# Patient Record
Sex: MALE | Race: WHITE | NOT HISPANIC OR LATINO | Employment: OTHER | URBAN - METROPOLITAN AREA
[De-identification: names, ages, dates, MRNs, and addresses within clinical notes are randomized per-mention and may not be internally consistent; named-entity substitution may affect disease eponyms.]

---

## 2024-05-08 ENCOUNTER — LAB (OUTPATIENT)
Dept: INTERNAL MEDICINE | Facility: CLINIC | Age: 44
End: 2024-05-08
Payer: MEDICARE

## 2024-05-08 ENCOUNTER — OFFICE VISIT (OUTPATIENT)
Dept: INTERNAL MEDICINE | Facility: CLINIC | Age: 44
End: 2024-05-08
Payer: MEDICARE

## 2024-05-08 VITALS
TEMPERATURE: 97.8 F | HEART RATE: 99 BPM | HEIGHT: 71 IN | OXYGEN SATURATION: 97 % | SYSTOLIC BLOOD PRESSURE: 114 MMHG | WEIGHT: 199.4 LBS | DIASTOLIC BLOOD PRESSURE: 60 MMHG | BODY MASS INDEX: 27.92 KG/M2 | RESPIRATION RATE: 18 BRPM

## 2024-05-08 DIAGNOSIS — M54.6 THORACIC SPINE PAIN: ICD-10-CM

## 2024-05-08 DIAGNOSIS — M51.36 DEGENERATIVE DISC DISEASE, LUMBAR: ICD-10-CM

## 2024-05-08 DIAGNOSIS — I10 PRIMARY HYPERTENSION: ICD-10-CM

## 2024-05-08 DIAGNOSIS — R79.89 LOW TESTOSTERONE: ICD-10-CM

## 2024-05-08 DIAGNOSIS — F31.9 BIPOLAR 1 DISORDER: Primary | ICD-10-CM

## 2024-05-08 LAB
ALBUMIN SERPL-MCNC: 4.4 G/DL (ref 3.5–5.2)
ALBUMIN/GLOB SERPL: 2 G/DL
ALP SERPL-CCNC: 63 U/L (ref 39–117)
ALT SERPL W P-5'-P-CCNC: 19 U/L (ref 1–41)
ANION GAP SERPL CALCULATED.3IONS-SCNC: 11.9 MMOL/L (ref 5–15)
AST SERPL-CCNC: 22 U/L (ref 1–40)
BILIRUB SERPL-MCNC: 0.3 MG/DL (ref 0–1.2)
BUN SERPL-MCNC: 23 MG/DL (ref 6–20)
BUN/CREAT SERPL: 24.2 (ref 7–25)
CALCIUM SPEC-SCNC: 9.3 MG/DL (ref 8.6–10.5)
CHLORIDE SERPL-SCNC: 101 MMOL/L (ref 98–107)
CHOLEST SERPL-MCNC: 173 MG/DL (ref 0–200)
CO2 SERPL-SCNC: 27.1 MMOL/L (ref 22–29)
CREAT SERPL-MCNC: 0.95 MG/DL (ref 0.76–1.27)
DEPRECATED RDW RBC AUTO: 40.7 FL (ref 37–54)
EGFRCR SERPLBLD CKD-EPI 2021: 102.5 ML/MIN/1.73
ERYTHROCYTE [DISTWIDTH] IN BLOOD BY AUTOMATED COUNT: 12.6 % (ref 12.3–15.4)
GLOBULIN UR ELPH-MCNC: 2.2 GM/DL
GLUCOSE SERPL-MCNC: 77 MG/DL (ref 65–99)
HCT VFR BLD AUTO: 38.3 % (ref 37.5–51)
HDLC SERPL-MCNC: 66 MG/DL (ref 40–60)
HGB BLD-MCNC: 13 G/DL (ref 13–17.7)
LDLC SERPL CALC-MCNC: 89 MG/DL (ref 0–100)
LDLC/HDLC SERPL: 1.31 {RATIO}
MCH RBC QN AUTO: 30.5 PG (ref 26.6–33)
MCHC RBC AUTO-ENTMCNC: 33.9 G/DL (ref 31.5–35.7)
MCV RBC AUTO: 89.9 FL (ref 79–97)
PLATELET # BLD AUTO: 281 10*3/MM3 (ref 140–450)
PMV BLD AUTO: 10.2 FL (ref 6–12)
POTASSIUM SERPL-SCNC: 4.3 MMOL/L (ref 3.5–5.2)
PROT SERPL-MCNC: 6.6 G/DL (ref 6–8.5)
RBC # BLD AUTO: 4.26 10*6/MM3 (ref 4.14–5.8)
SODIUM SERPL-SCNC: 140 MMOL/L (ref 136–145)
TRIGL SERPL-MCNC: 102 MG/DL (ref 0–150)
TSH SERPL DL<=0.05 MIU/L-ACNC: 0.7 UIU/ML (ref 0.27–4.2)
VLDLC SERPL-MCNC: 18 MG/DL (ref 5–40)
WBC NRBC COR # BLD AUTO: 7.97 10*3/MM3 (ref 3.4–10.8)

## 2024-05-08 PROCEDURE — 99204 OFFICE O/P NEW MOD 45 MIN: CPT

## 2024-05-08 PROCEDURE — 84403 ASSAY OF TOTAL TESTOSTERONE: CPT

## 2024-05-08 PROCEDURE — 1160F RVW MEDS BY RX/DR IN RCRD: CPT

## 2024-05-08 PROCEDURE — 80053 COMPREHEN METABOLIC PANEL: CPT

## 2024-05-08 PROCEDURE — 80061 LIPID PANEL: CPT

## 2024-05-08 PROCEDURE — 85027 COMPLETE CBC AUTOMATED: CPT

## 2024-05-08 PROCEDURE — 36415 COLL VENOUS BLD VENIPUNCTURE: CPT

## 2024-05-08 PROCEDURE — 84443 ASSAY THYROID STIM HORMONE: CPT

## 2024-05-08 PROCEDURE — 1159F MED LIST DOCD IN RCRD: CPT

## 2024-05-08 PROCEDURE — 1126F AMNT PAIN NOTED NONE PRSNT: CPT

## 2024-05-08 RX ORDER — HYDROXYZINE PAMOATE 50 MG/1
1 CAPSULE ORAL 3 TIMES DAILY
COMMUNITY
Start: 2024-03-28

## 2024-05-08 RX ORDER — BUPROPION HYDROCHLORIDE 300 MG/1
TABLET ORAL
COMMUNITY
Start: 2024-04-15 | End: 2024-05-08 | Stop reason: SDUPTHER

## 2024-05-08 RX ORDER — MONTELUKAST SODIUM 10 MG/1
TABLET ORAL
COMMUNITY
Start: 2024-04-25

## 2024-05-08 RX ORDER — LISINOPRIL 10 MG/1
1 TABLET ORAL DAILY
COMMUNITY
Start: 2023-12-21

## 2024-05-08 RX ORDER — METHOCARBAMOL 500 MG/1
500 TABLET, FILM COATED ORAL 4 TIMES DAILY
Qty: 90 TABLET | Refills: 3 | Status: SHIPPED | OUTPATIENT
Start: 2024-05-08

## 2024-05-08 RX ORDER — CETIRIZINE HYDROCHLORIDE 10 MG/1
10 TABLET ORAL DAILY
COMMUNITY

## 2024-05-08 RX ORDER — QUETIAPINE FUMARATE 300 MG/1
TABLET, FILM COATED ORAL
COMMUNITY
Start: 2024-04-11

## 2024-05-08 RX ORDER — QUETIAPINE FUMARATE 50 MG/1
TABLET, FILM COATED ORAL
COMMUNITY
Start: 2024-04-15

## 2024-05-08 RX ORDER — BUPROPION HYDROCHLORIDE 300 MG/1
300 TABLET ORAL EVERY MORNING
Qty: 90 TABLET | Refills: 1 | Status: SHIPPED | OUTPATIENT
Start: 2024-05-08

## 2024-05-08 RX ORDER — BUPRENORPHINE AND NALOXONE 8; 2 MG/1; MG/1
FILM, SOLUBLE BUCCAL; SUBLINGUAL
COMMUNITY
Start: 2024-05-06

## 2024-05-08 RX ORDER — CLONIDINE HYDROCHLORIDE 0.1 MG/1
TABLET ORAL
COMMUNITY
Start: 2024-05-03

## 2024-05-08 RX ORDER — TESTOSTERONE CYPIONATE 200 MG/ML
INJECTION, SOLUTION INTRAMUSCULAR
COMMUNITY
Start: 2024-01-18

## 2024-05-08 RX ORDER — SILDENAFIL 100 MG/1
1 TABLET, FILM COATED ORAL DAILY
COMMUNITY
Start: 2024-03-15

## 2024-05-08 RX ORDER — VENLAFAXINE HYDROCHLORIDE 150 MG/1
150 CAPSULE, EXTENDED RELEASE ORAL
COMMUNITY

## 2024-05-08 RX ORDER — LEVOCETIRIZINE DIHYDROCHLORIDE 5 MG/1
TABLET, FILM COATED ORAL
COMMUNITY
Start: 2024-04-25

## 2024-05-08 RX ORDER — QUETIAPINE FUMARATE 100 MG/1
100 TABLET, FILM COATED ORAL
COMMUNITY

## 2024-05-09 ENCOUNTER — HOSPITAL ENCOUNTER (OUTPATIENT)
Dept: GENERAL RADIOLOGY | Facility: HOSPITAL | Age: 44
Discharge: HOME | End: 2024-05-09
Payer: MEDICARE

## 2024-05-09 DIAGNOSIS — M51.36 DEGENERATIVE DISC DISEASE, LUMBAR: ICD-10-CM

## 2024-05-09 LAB — TESTOST SERPL-MCNC: 39.9 NG/DL (ref 249–836)

## 2024-05-09 PROCEDURE — 72070 X-RAY EXAM THORAC SPINE 2VWS: CPT

## 2024-05-09 PROCEDURE — 72100 X-RAY EXAM L-S SPINE 2/3 VWS: CPT

## 2024-05-10 ENCOUNTER — PATIENT ROUNDING (BHMG ONLY) (OUTPATIENT)
Dept: INTERNAL MEDICINE | Facility: CLINIC | Age: 44
End: 2024-05-10
Payer: MEDICARE

## 2024-05-10 ENCOUNTER — TELEPHONE (OUTPATIENT)
Dept: INTERNAL MEDICINE | Facility: CLINIC | Age: 44
End: 2024-05-10

## 2024-05-10 ENCOUNTER — TELEPHONE (OUTPATIENT)
Dept: INTERNAL MEDICINE | Facility: CLINIC | Age: 44
End: 2024-05-10
Payer: MEDICARE

## 2024-05-10 NOTE — TELEPHONE ENCOUNTER
Caller: Daniel Law    Relationship: Self    Best call back number: [phone]     What is the medical concern/diagnosis: LOW TESTOSTERONE    What specialty or service is being requested: UROLOGY    What is the office location: [facility]    Any additional details: PATIENT ADVISES THAT PREVIOUS REFERRAL SENT FOR UROLOGY IS OUT OF NETWORK WITH HIS INSURANCE, PLEASE SEND ANOTHER REFERRAL. THANK YOU.

## 2024-05-10 NOTE — TELEPHONE ENCOUNTER
Caller: Daniel Law    Relationship: Self    Best call back number: [phone]     What orders are you requesting (i.e. lab or imaging): MRI OF MID AND LOWER BACK    In what timeframe would the patient need to come in: ASAP    Where will you receive your lab/imaging services: ANYWHERE    Additional notes: PATIENT ADVISES IN ADDITION TO X-RAYS HE WOULD LIKE TO HAVE AN MRI DONE.

## 2024-05-13 ENCOUNTER — TELEPHONE (OUTPATIENT)
Dept: INTERNAL MEDICINE | Facility: CLINIC | Age: 44
End: 2024-05-13
Payer: MEDICARE

## 2024-05-13 NOTE — TELEPHONE ENCOUNTER
Left patient a message letting him know I am sending him a my chart message letting him know the results of his xrays.

## 2024-05-14 ENCOUNTER — TELEPHONE (OUTPATIENT)
Dept: INTERNAL MEDICINE | Facility: CLINIC | Age: 44
End: 2024-05-14

## 2024-05-14 NOTE — TELEPHONE ENCOUNTER
Caller: Daniel Law    Relationship: Self    Best call back number: [phone]     What is the best time to reach you: ANY    Who are you requesting to speak with (clinical staff, provider,  specific staff member): REFERRAL COORDINATOR    What was the call regarding: THE PATIENT WAS TOLD THAT HIS UROLOGY REFERRAL WAS SENT TO  UROLOGY. HE JUST SPOKE TO THEM AND THEY DO NOT HAVE A REFERRAL. HE WAS ORIGINALLY SENT TO [facility] BUT THEY ARE NOT IN NETWORK SO IT HAD TO BE TRANSFERRED. THE PATIENT SAID THAT IT SHOULD HAVE BEEN MARKED AS AN URGENT REFERRAL. PLEASE RESEND THIS ASAP AND CALL HIM TO LET HIM KNOW. HE IS ASKING IF POSSIBLE FOR THIS TO BE SENT TODAY.     Is it okay if the provider responds through [employer]: NO

## 2024-05-17 ENCOUNTER — TELEPHONE (OUTPATIENT)
Dept: INTERNAL MEDICINE | Facility: CLINIC | Age: 44
End: 2024-05-17
Payer: MEDICARE

## 2024-05-17 RX ORDER — HYDROXYZINE PAMOATE 50 MG/1
50 CAPSULE ORAL 3 TIMES DAILY
Qty: 90 CAPSULE | Refills: 0 | Status: SHIPPED | OUTPATIENT
Start: 2024-05-17

## 2024-05-17 RX ORDER — TESTOSTERONE CYPIONATE 200 MG/ML
INJECTION, SOLUTION INTRAMUSCULAR
OUTPATIENT
Start: 2024-05-17

## 2024-05-17 NOTE — TELEPHONE ENCOUNTER
HUB to relay:  Dr. Bourgeois has denied the request for testosterone: I denied tesosterone prescription - looks like [place] has referred to Urology for this so will be getting from them and not our office

## 2024-05-17 NOTE — TELEPHONE ENCOUNTER
America Bourgeois MD  [facility] [address] [facility]  I denied tesosterone prescription - looks like [place] has referred to Urology for this so will be getting from them and not our office

## 2024-05-21 DIAGNOSIS — M51.36 DEGENERATIVE DISC DISEASE, LUMBAR: Primary | ICD-10-CM

## 2024-05-21 DIAGNOSIS — M54.6 THORACIC SPINE PAIN: ICD-10-CM

## 2024-05-22 RX ORDER — LEVOCETIRIZINE DIHYDROCHLORIDE 5 MG/1
5 TABLET, FILM COATED ORAL EVERY EVENING
Qty: 90 TABLET | Refills: 1 | Status: SHIPPED | OUTPATIENT
Start: 2024-05-22

## 2024-05-22 RX ORDER — QUETIAPINE FUMARATE 100 MG/1
100 TABLET, FILM COATED ORAL NIGHTLY
Qty: 90 TABLET | Refills: 1 | Status: SHIPPED | OUTPATIENT
Start: 2024-05-22

## 2024-05-22 RX ORDER — MONTELUKAST SODIUM 10 MG/1
10 TABLET ORAL NIGHTLY
Qty: 90 TABLET | Refills: 1 | Status: SHIPPED | OUTPATIENT
Start: 2024-05-22

## 2024-05-22 RX ORDER — VENLAFAXINE HYDROCHLORIDE 150 MG/1
150 CAPSULE, EXTENDED RELEASE ORAL DAILY
Qty: 90 CAPSULE | Refills: 1 | Status: SHIPPED | OUTPATIENT
Start: 2024-05-22

## 2024-05-22 RX ORDER — QUETIAPINE FUMARATE 300 MG/1
300 TABLET, FILM COATED ORAL NIGHTLY
Qty: 90 TABLET | Refills: 1 | Status: SHIPPED | OUTPATIENT
Start: 2024-05-22

## 2024-05-22 NOTE — TELEPHONE ENCOUNTER
Patient has been notified about the MRI and PT.  Patient also needs refills on:  montelukast, levocetrizine, both doses of quetiapine and venlafaxine.  Please send to [facility].

## 2024-05-23 RX ORDER — LEVOCETIRIZINE DIHYDROCHLORIDE 5 MG/1
5 TABLET, FILM COATED ORAL EVERY EVENING
Qty: 90 TABLET | Refills: 1 | OUTPATIENT
Start: 2024-05-23

## 2024-05-23 RX ORDER — VENLAFAXINE HYDROCHLORIDE 150 MG/1
150 CAPSULE, EXTENDED RELEASE ORAL DAILY
Qty: 90 CAPSULE | Refills: 1 | OUTPATIENT
Start: 2024-05-23

## 2024-05-23 RX ORDER — MONTELUKAST SODIUM 10 MG/1
10 TABLET ORAL NIGHTLY
Qty: 90 TABLET | Refills: 1 | OUTPATIENT
Start: 2024-05-23

## 2024-05-23 RX ORDER — QUETIAPINE FUMARATE 300 MG/1
300 TABLET, FILM COATED ORAL NIGHTLY
Qty: 90 TABLET | Refills: 1 | OUTPATIENT
Start: 2024-05-23

## 2024-05-23 RX ORDER — QUETIAPINE FUMARATE 100 MG/1
100 TABLET, FILM COATED ORAL NIGHTLY
Qty: 90 TABLET | Refills: 1 | OUTPATIENT
Start: 2024-05-23

## 2024-05-23 NOTE — TELEPHONE ENCOUNTER
Lisinopril was last prescribed by historical provider       Last office visit with prescribing clinician: 5/8/2024     Next office visit with prescribing clinician: 11/11/2024

## 2024-05-28 RX ORDER — LISINOPRIL 10 MG/1
10 TABLET ORAL DAILY
Qty: 90 TABLET | Refills: 1 | Status: SHIPPED | OUTPATIENT
Start: 2024-05-28

## 2024-05-29 ENCOUNTER — TELEPHONE (OUTPATIENT)
Dept: INTERNAL MEDICINE | Facility: CLINIC | Age: 44
End: 2024-05-29
Payer: MEDICARE

## 2024-05-29 NOTE — TELEPHONE ENCOUNTER
Patient has been notified his medication was sent to the pharmacy yesterday.  He is concerned about his insurance and not being able to be seen by his primary care.  I gave him the number to billing and asked him to call them with his questions.

## 2024-06-26 ENCOUNTER — TELEPHONE (OUTPATIENT)
Dept: INTERNAL MEDICINE | Facility: CLINIC | Age: 44
End: 2024-06-26
Payer: MEDICARE

## 2024-06-26 NOTE — TELEPHONE ENCOUNTER
----- Message from [place] sent at 6/25/2024  3:11 PM EDT -----  Please let Daniel know that his MRI is just showed degenerative disc disease.  If you would like for me to I will put in a referral to physical therapy as this will help with his pain.  Also recommend continuing the muscle relaxers can prescribe anti-inflammatories if he is interested

## 2024-06-26 NOTE — TELEPHONE ENCOUNTER
Left patient voicemail to go over lab results.   Office number provided.        HUB RELAY:  Please let Daniel know that his MRI is just showed degenerative disc disease. If you would like for me to I will put in a referral to physical therapy as this will help with his pain. Also recommend continuing the muscle relaxers can prescribe anti-inflammatories if he is interested

## 2024-07-05 ENCOUNTER — HOSPITAL ENCOUNTER (EMERGENCY)
Facility: HOSPITAL | Age: 44
Discharge: HOME | End: 2024-07-06
Attending: EMERGENCY MEDICINE
Payer: MEDICARE

## 2024-07-05 VITALS
TEMPERATURE: 98.9 F | HEART RATE: 129 BPM | DIASTOLIC BLOOD PRESSURE: 83 MMHG | BODY MASS INDEX: 26.6 KG/M2 | RESPIRATION RATE: 13 BRPM | OXYGEN SATURATION: 100 % | HEIGHT: 71 IN | SYSTOLIC BLOOD PRESSURE: 148 MMHG | WEIGHT: 190 LBS

## 2024-07-05 DIAGNOSIS — F19.10 DRUG ABUSE: Primary | ICD-10-CM

## 2024-07-05 DIAGNOSIS — R00.0 TACHYCARDIA: ICD-10-CM

## 2024-07-05 PROCEDURE — 99283 EMERGENCY DEPT VISIT LOW MDM: CPT

## 2024-07-06 NOTE — DISCHARGE INSTRUCTIONS
Follow-up with your primary care provider for further evaluation.  Return to the emergency department for any worsening symptoms.  Thank you for allowing us to participate in your care today.

## 2024-07-06 NOTE — FSED PROVIDER NOTE
Subjective  History of Present Illness:    This is a 43 year old male who present with anxiety. Patient does endorse methamphetamine use today and was found running down [facility]. Police recommended he come to the ER for evaluation. Patient has no complaints other than anxiety. He denies any shortness of breath, no chest pain, no nausea, vomiting.       Nurses Notes reviewed and agree, including vitals, allergies, social history and prior medical history.     REVIEW OF SYSTEMS: All systems reviewed and not pertinent unless noted.  Review of Systems   Psychiatric/Behavioral:  Positive for agitation.        Past Medical History:   Diagnosis Date    ADHD (attention deficit hyperactivity disorder) 2015    Allergic 2007    Busipron, lithium,zyprexa,invega,thorizine,cymbalta,zolft,celxa,vivotrol,lucemyra,abilify,vraylar,    Anxiety 2008    Arthritis 2009    Disc generative disease bulging disc    COPD (chronic obstructive pulmonary disease) 2013    Scared lungs from pneumonia [facility]    Erectile dysfunction 2022    Hypertension 2010    Low back pain 2010    Pneumonia     Substance abuse 1999       Allergies:    Chlorpromazine, Lithium, Olanzapine, Paliperidone, Flunarizine, Celecoxib, Ibuprofen, Sulfamethoxazole-trimethoprim, Tramadol, and Trazodone      Past Surgical History:   Procedure Laterality Date    SPINE SURGERY  2010    Ex-ray injection         Social History     Socioeconomic History    Marital status:    Tobacco Use    Smoking status: Former     Current packs/day: 0.00     Average packs/day: 3.0 packs/day for 26.1 years (78.2 ttl pk-yrs)     Types: Cigarettes     Start date: 1/9/1995     Quit date: 2/2/2021     Years since quitting: 3.4     Passive exposure: Past    Smokeless tobacco: Never   Vaping Use    Vaping status: Never Used   Substance and Sexual Activity    Alcohol use: Yes     Alcohol/week: 12.0 standard drinks of alcohol     Types: 12 Shots of liquor per week  "    Comment: Pint 100 proof boogie    Drug use: Defer    Sexual activity: Not Currently     Partners: Female     Birth control/protection: Abstinence     Comment: Need prior authorization for implant         Family History   Problem Relation Age of Onset    Alcohol abuse Father     Anxiety disorder Father     Arthritis Father     Hyperlipidemia Father     Hypertension Father     Drug abuse Brother        Objective  Physical Exam:  /83   Pulse (!) 129   Temp 98.9 °F (37.2 °C) (Oral)   Resp 13   Ht 180.3 cm (71\")   Wt 86.2 kg (190 lb)   SpO2 100%   BMI 26.50 kg/m²      Physical Exam  Vitals and nursing note reviewed.   Constitutional:       Appearance: He is diaphoretic.   Cardiovascular:      Rate and Rhythm: Regular rhythm. Tachycardia present.      Pulses: Normal pulses.      Heart sounds: Normal heart sounds.   Pulmonary:      Effort: Pulmonary effort is normal.      Breath sounds: Normal breath sounds.   Abdominal:      General: Abdomen is flat. Bowel sounds are normal.      Palpations: Abdomen is soft.   Musculoskeletal:         General: Normal range of motion.   Skin:     General: Skin is warm and dry.   Neurological:      General: No focal deficit present.      Mental Status: He is alert and oriented to person, place, and time.         Procedures    ED Course:         Lab Results (last 24 hours)       Procedure Component Value Units Date/Time    ED HIV 1/2 Antibody/Antigen Screen w/Reflex to HIV 1/2 Differentiation [[id number]]  (Normal) Collected: 07/05/24 1535     Updated: 07/05/24 2152    Hepatitis C Antibody - ED [[id number]]  (Normal) Collected: 07/05/24 1535    Specimen: Blood, Venous Line Updated: 07/05/24 2152     HCV Qual Negative    CK [[id number]]  (Abnormal) Collected: 07/05/24 1535    Specimen: Blood, Venous Line Updated: 07/05/24 2152     Creatine Kinase 886 U/L     Blood gas panel, venous [[id number]]  (Abnormal) Collected: 07/05/24 1535    Specimen: Blood, Venous Line Updated: " 07/05/24 2152     pH, Venous 7.40     pCO2, Capillary 38 mmHg      pO2, Venous 48 mmHg      SO2 82 %      Base Excess -0.8 mmol/L      HCO3, Venous 24 mmol/L      Hematocrit, Blood Gas 41.0 %      Sodium 144 mmol/L      Potassium 3.9 mmol/L      Chloride 106 mmol/L      Glucose 108 mg/dL      Lactate, Arterial 2.0 mmol/L      Ionized Calcium, Arterial 5.0 mg/dL     PROBNP (REFERENCE) [[id number]]  (Normal) Collected: 07/05/24 1535    Specimen: Blood, Venous Line Updated: 07/05/24 2152     proBNP <50 pg/mL     TROPONIN [[id number]]  (Normal) Collected: 07/05/24 1535    Specimen: Blood, Venous Line Updated: 07/05/24 2152     Troponin T 9 ng/L     ETHANOL [[id number]]  (Normal) Collected: 07/05/24 1535    Specimen: Blood, Venous Line Updated: 07/05/24 2152     Ethanol <10 mg/dL     Narrative:      Enzymatic Assay:    Performed on Roche Ivet.    MAGNESIUM [[id number]]  (Normal) Collected: 07/05/24 1535    Specimen: Blood, Venous Line Updated: 07/05/24 2152     Magnesium 2.1 mg/dL     COMPREHENSIVE METABOLIC PANEL [[id number]]  (Abnormal) Collected: 07/05/24 1535     Updated: 07/05/24 2152    PROTIME-INR [[id number]]  (Abnormal) Collected: 07/05/24 1535    Specimen: Blood, Venous Line Updated: 07/05/24 2152     Protime 14.4 sec      INR 1.1    Narrative:      OPTIMAL INR RANGES FOR PATIENT ON ORAL ANTICOAGULANT THERAPY    Prevention of venous thromboembolism      INR 2.0 to 3.0    In patients with heart disease:  Atrial fibrillation                  INR 2.0 to 3.0  Valvular heart disease               INR 2.0 to 3.0  Tissue heart valves                  INR 2.0 to 3.0  Mechanical prosthetic valves         INR 2.5 to 3.5  Prevention of recurrent MI           INR 2.5 to 3.5    CBC AND DIFFERENTIAL [[id number]]  (Abnormal) Collected: 07/05/24 1535    Specimen: Blood, Venous Line Updated: 07/05/24 2152     WBC 8.97 10*3/uL      RBC 4.36 10*6/uL      Hemoglobin 13.3 g/dL      Hematocrit 38.7 %      Platelets 310 10*3/uL      MCV 89  fL      MCH 30.5 pg      MCHC 34.4 g/dL      RDW 13.3 %      MPV 9.2 fL      nRBC 0.0 per 100 WBCs      Differential Type Automated     Neutrophil Rel % 93.0 %      Lymphocyte Rel % 6.0 %      Monocyte Rel % 1.0 %      Eosinophil % 0.0 %      Basophil Rel % 0.0 %      Immature Grans % 0.0 %      Neutrophils Absolute 8.21 10*3/uL      Lymphocytes Absolute 0.57 10*3/uL      Monocytes Absolute 0.12 10*3/uL      Eosinophils Absolute 0.01 10*3/uL      Basophils Absolute 0.04 10*3/uL      Immature Grans, Absolute 0.02 10*3/uL     Narrative:      Therapeutic decision making should be based on absolute values, rather than percentages.    Compliance Drug Analysis, Ur - [[id number]] Collected: 07/05/24 1536    Specimen: Urine, Clean Catch Updated: 07/05/24 2152     Amphet/Methamphet, Screen Presumptive positive. Confirmation by LC-MS/MS to follow.     Benzodiazepine Screen, Urine Negative     THC Screen, Urine Presumptive positive. Confirmation by LC-MS/MS to follow.     Cocaine Screen, Urine Presumptive positive. Confirmation by LC-MS/MS to follow.     Barbiturates Screen, Urine Negative     Opiate Screen Negative     Methadone Screen, Urine Negative     Buprenorphine, Urine Presumptive positive. Confirmation by LC-MS/MS to follow.     Fentanyl, Urine Presumptive positive. Confirmation by LC-MS/MS to follow.     Oxycodone Screen, Urine Negative    Urinalysis With Microscopic If Indicated (No Culture) - [[id number]]  (Abnormal) Collected: 07/05/24 1536    Specimen: Urine, Clean Catch Updated: 07/05/24 2152     Color, UA Dark Yellow     Appearance, UA Cloudy     Specific Gravity, UA >=1.030     pH, UA 5.5     Total Protein, urine 30 mg/dL      Glucose Negative mg/dL      External Ketones, Urine 40 mg/dL      External Hemoglobin, Urine Negative     Bilirubin, UA Negative     Urobilinogen, UA 1.0 mg/dL      Leukocytes, UA Negative     Nitrite, Quantitative, Urine Negative    CK [[id number]]  (Abnormal) Collected: 07/05/24 1808     Specimen: Blood, Venous Line Updated: 07/05/24 2152     Creatine Kinase 784 U/L     TROPONIN T REFERENCE [[id number]]  (Normal) Collected: 07/05/24 1808    Specimen: Blood, Venous Line Updated: 07/05/24 2152     Troponin T 9 ng/L              XR Chest 1 View    Result Date: 7/5/2024  CLINICAL INDICATION: chest pain TECHNIQUE: XR CHEST 1 VIEW COMPARISON: Radiographic rib series 6/26/2024 FINDINGS: Stable cardiac silhouette and mediastinal contours. Lungs are clear. No pleural effusion or pneumothorax. No acute osseous abnormality.    Impression: No acute findings. CRITICAL RESULT:   No. COMMUNICATION: Per this written report. By electronically signing this report, I, the attending physician, attest that I have personally reviewed the images/data for the above examination(s) and agree with the final edited report. Drafted by Alvin Hough DO on 7/5/2024 3:39 PM Final report signed by Corrina Hale MD on 7/5/2024 3:45 PM        MDM     Amount and/or Complexity of Data Reviewed  Tests in the medicine section of CPT®: reviewed        Initial impression of presenting illness: Patient evaluated emergency department for anxiety.  Patient recently seen at the [facility] just prior to arrival at our facility.  Workup there was unremarkable with the exception of methamphetamine and CK elevation.  Patient able to eat and drink and felt much better after resting in the emergency department.  Patient is currently homeless but states that he has a room that he could go rent.  Patient denies any suicidal ideation, no chest pain, shortness of breath.  Patient will be discharged with instructions to follow-up with his primary care for further evaluation.  Discussed findings and plan of care with the patient who is agreeable to discharge.     DDX: includes but is not limited to: Drug abuse, tachycardia, rhabdomyolysis      Medications - No data to display      Data interpreted: Nursing notes reviewed, vital signs  reviewed.  Labs independently interpreted by me (CBC, CMP, lipase, UA, troponin, ABG, lactic acid, procalcitonin).  Imaging independently interpreted by me (x-ray, CT scan).  EKG independently interpreted by me.  O2 saturation:    Counseling: Discussed the results above with the patient regarding need for admission or discharge.  Patient understands and agrees plan of care.      -----  ED Disposition       None          Final diagnoses:   None     Your Follow-Up Providers    Follow-up information has not been specified.       Contact information for after-discharge care    Follow-up information has not been specified.          Your medication list        ASK your doctor about these medications        Instructions Last Dose Given Next Dose Due   buprenorphine-naloxone 8-2 MG film film  Commonly known as: SUBOXONE           buPROPion  MG 24 hr tablet  Commonly known as: WELLBUTRIN XL      Take 1 tablet by mouth Every Morning.       cloNIDine 0.1 MG tablet  Commonly known as: CATAPRES           hydrOXYzine pamoate 50 MG capsule  Commonly known as: VISTARIL      Take 1 capsule by mouth 3 times a day.       levocetirizine 5 MG tablet  Commonly known as: XYZAL      Take 1 tablet by mouth Every Evening.       lisinopril 10 MG tablet  Commonly known as: PRINIVIL,ZESTRIL      Take 1 tablet by mouth Daily.       methocarbamol 500 MG tablet  Commonly known as: ROBAXIN      Take 1 tablet by mouth 4 (Four) Times a Day.       montelukast 10 MG tablet  Commonly known as: SINGULAIR      Take 1 tablet by mouth Every Night.       QUEtiapine 300 MG tablet  Commonly known as: SEROquel      Take 1 tablet by mouth Every Night.       QUEtiapine 100 MG tablet  Commonly known as: SEROquel      Take 1 tablet by mouth Every Night.       sildenafil 100 MG tablet  Commonly known as: VIAGRA      Take 1 tablet by mouth Daily.       Testosterone Cypionate 200 MG/ML injection  Commonly known as: DEPOTESTOTERONE CYPIONATE      INJECT 1ML  INTRAMUSCULARLY EVERY OTHER WEEK AS DIRECTED       venlafaxine  MG 24 hr capsule  Commonly known as: EFFEXOR-XR      Take 1 capsule by mouth Daily.

## 2024-08-26 ENCOUNTER — E-VISIT (OUTPATIENT)
Dept: ADMINISTRATIVE | Facility: OTHER | Age: 44
End: 2024-08-26
Payer: MEDICARE

## 2024-08-26 NOTE — E-VISIT ESCALATED
Status: Referred Out  Date: 2024 14:56:46  Acuity Level: Within 1-2 weeks  Referral message:  Your health is our priority. [facility] only allows for refills of erectile dysfunction medication. Because you would like to start a new erectile dysfunction medication, we would like for you to be seen in person to discuss your   health history and determine the treatment that will be both safe and effective for you.  For the most appropriate care, please be seen:   At a clinic  Within 1-2 weeks   You won't be charged for this visit. Thank you for trusting us with your care!   Patient: Daniel Law  Patient : 1980  Patient Address: [place] [address]  Patient Phone: [phone]  Clinician Response: Unavailable  Diagnosis: Unavailable  Diagnosis ICD: Unavailable     Patient Interview Questions and Responses:  Clinical Protocol: Erectile dysfunction  Please select the reason for your visit today.: Erectile dysfunction  Please tell us the reason for your visit today.: Start a new erectile dysfunction medication

## 2025-03-03 ENCOUNTER — HOSPITAL ENCOUNTER (EMERGENCY)
Facility: HOSPITAL | Age: 45
Discharge: HOME | End: 2025-03-03
Attending: EMERGENCY MEDICINE | Admitting: EMERGENCY MEDICINE
Payer: MEDICARE

## 2025-03-03 ENCOUNTER — APPOINTMENT (OUTPATIENT)
Dept: GENERAL RADIOLOGY | Facility: HOSPITAL | Age: 45
End: 2025-03-03
Payer: MEDICARE

## 2025-03-03 VITALS
TEMPERATURE: 98.3 F | WEIGHT: 170 LBS | SYSTOLIC BLOOD PRESSURE: 144 MMHG | OXYGEN SATURATION: 99 % | DIASTOLIC BLOOD PRESSURE: 84 MMHG | HEIGHT: 71 IN | BODY MASS INDEX: 23.8 KG/M2 | HEART RATE: 92 BPM | RESPIRATION RATE: 14 BRPM

## 2025-03-03 DIAGNOSIS — F41.0 PANIC ATTACK: ICD-10-CM

## 2025-03-03 DIAGNOSIS — R07.9 CHEST PAIN, UNSPECIFIED TYPE: Primary | ICD-10-CM

## 2025-03-03 DIAGNOSIS — R06.02 SOB (SHORTNESS OF BREATH): ICD-10-CM

## 2025-03-03 LAB
ALBUMIN SERPL-MCNC: 4.8 G/DL (ref 3.5–5.2)
ALBUMIN/GLOB SERPL: 1.7 G/DL
ALP SERPL-CCNC: 78 U/L (ref 39–117)
ALT SERPL W P-5'-P-CCNC: 34 U/L (ref 1–41)
ANION GAP SERPL CALCULATED.3IONS-SCNC: 13 MMOL/L (ref 5–15)
AST SERPL-CCNC: 38 U/L (ref 1–40)
BASOPHILS # BLD AUTO: 0.05 10*3/MM3 (ref 0–0.2)
BASOPHILS NFR BLD AUTO: 0.8 % (ref 0–1.5)
BILIRUB SERPL-MCNC: 0.4 MG/DL (ref 0–1.2)
BUN SERPL-MCNC: 16 MG/DL (ref 6–20)
BUN/CREAT SERPL: 21.3 (ref 7–25)
CALCIUM SPEC-SCNC: 10.1 MG/DL (ref 8.6–10.5)
CHLORIDE SERPL-SCNC: 100 MMOL/L (ref 98–107)
CO2 SERPL-SCNC: 26 MMOL/L (ref 22–29)
CREAT SERPL-MCNC: 0.75 MG/DL (ref 0.76–1.27)
DEPRECATED RDW RBC AUTO: 45.6 FL (ref 37–54)
EGFRCR SERPLBLD CKD-EPI 2021: 114.8 ML/MIN/1.73
EOSINOPHIL # BLD AUTO: 0.15 10*3/MM3 (ref 0–0.4)
EOSINOPHIL NFR BLD AUTO: 2.4 % (ref 0.3–6.2)
ERYTHROCYTE [DISTWIDTH] IN BLOOD BY AUTOMATED COUNT: 13.3 % (ref 12.3–15.4)
GLOBULIN UR ELPH-MCNC: 2.8 GM/DL
GLUCOSE SERPL-MCNC: 109 MG/DL (ref 65–99)
HCT VFR BLD AUTO: 40.2 % (ref 37.5–51)
HGB BLD-MCNC: 13.5 G/DL (ref 13–17.7)
HOLD SPECIMEN: NORMAL
IMM GRANULOCYTES # BLD AUTO: 0.01 10*3/MM3 (ref 0–0.05)
IMM GRANULOCYTES NFR BLD AUTO: 0.2 % (ref 0–0.5)
LIPASE SERPL-CCNC: 18 U/L (ref 13–60)
LYMPHOCYTES # BLD AUTO: 1.67 10*3/MM3 (ref 0.7–3.1)
LYMPHOCYTES NFR BLD AUTO: 26.8 % (ref 19.6–45.3)
MCH RBC QN AUTO: 30.9 PG (ref 26.6–33)
MCHC RBC AUTO-ENTMCNC: 33.6 G/DL (ref 31.5–35.7)
MCV RBC AUTO: 92 FL (ref 79–97)
MONOCYTES # BLD AUTO: 0.58 10*3/MM3 (ref 0.1–0.9)
MONOCYTES NFR BLD AUTO: 9.3 % (ref 5–12)
NEUTROPHILS NFR BLD AUTO: 3.77 10*3/MM3 (ref 1.7–7)
NEUTROPHILS NFR BLD AUTO: 60.5 % (ref 42.7–76)
NRBC BLD AUTO-RTO: 0 /100 WBC (ref 0–0.2)
NT-PROBNP SERPL-MCNC: 325.8 PG/ML (ref 0–450)
PLATELET # BLD AUTO: 273 10*3/MM3 (ref 140–450)
PMV BLD AUTO: 9.6 FL (ref 6–12)
POTASSIUM SERPL-SCNC: 4 MMOL/L (ref 3.5–5.2)
PROT SERPL-MCNC: 7.6 G/DL (ref 6–8.5)
QT INTERVAL: 348 MS
QT INTERVAL: 380 MS
QTC INTERVAL: 441 MS
QTC INTERVAL: 454 MS
RBC # BLD AUTO: 4.37 10*6/MM3 (ref 4.14–5.8)
SODIUM SERPL-SCNC: 139 MMOL/L (ref 136–145)
TROPONIN T SERPL HS-MCNC: 7 NG/L
TROPONIN T SERPL HS-MCNC: 7 NG/L
WBC NRBC COR # BLD AUTO: 6.23 10*3/MM3 (ref 3.4–10.8)
WHOLE BLOOD HOLD COAG: NORMAL
WHOLE BLOOD HOLD SPECIMEN: NORMAL

## 2025-03-03 PROCEDURE — 83690 ASSAY OF LIPASE: CPT | Performed by: EMERGENCY MEDICINE

## 2025-03-03 PROCEDURE — 84484 ASSAY OF TROPONIN QUANT: CPT | Performed by: EMERGENCY MEDICINE

## 2025-03-03 PROCEDURE — 99284 EMERGENCY DEPT VISIT MOD MDM: CPT

## 2025-03-03 PROCEDURE — 85025 COMPLETE CBC W/AUTO DIFF WBC: CPT | Performed by: EMERGENCY MEDICINE

## 2025-03-03 PROCEDURE — 71045 X-RAY EXAM CHEST 1 VIEW: CPT

## 2025-03-03 PROCEDURE — 93005 ELECTROCARDIOGRAM TRACING: CPT | Performed by: EMERGENCY MEDICINE

## 2025-03-03 PROCEDURE — 83880 ASSAY OF NATRIURETIC PEPTIDE: CPT | Performed by: EMERGENCY MEDICINE

## 2025-03-03 PROCEDURE — 80053 COMPREHEN METABOLIC PANEL: CPT | Performed by: EMERGENCY MEDICINE

## 2025-03-03 PROCEDURE — 36415 COLL VENOUS BLD VENIPUNCTURE: CPT

## 2025-03-03 RX ORDER — SODIUM CHLORIDE 0.9 % (FLUSH) 0.9 %
10 SYRINGE (ML) INJECTION AS NEEDED
Status: DISCONTINUED | OUTPATIENT
Start: 2025-03-03 | End: 2025-03-03 | Stop reason: HOSPADM

## 2025-03-03 RX ORDER — HYDROXYZINE HYDROCHLORIDE 25 MG/1
25 TABLET, FILM COATED ORAL ONCE
Status: COMPLETED | OUTPATIENT
Start: 2025-03-03 | End: 2025-03-03

## 2025-03-03 RX ORDER — ALBUTEROL SULFATE 90 UG/1
2 INHALANT RESPIRATORY (INHALATION)
COMMUNITY
Start: 2024-05-16

## 2025-03-03 RX ADMIN — HYDROXYZINE HYDROCHLORIDE 25 MG: 25 TABLET ORAL at 12:19

## 2025-03-03 NOTE — ED PROVIDER NOTES
Subjective   History of Present Illness  Pt is a 45 yo male presenting to ED with complaints of chest pain. PMHx significant for COPD, HTN, ADHD, Anxiety and Depression. Pt reports just PTA was talking to mother on the phone and got upset causing him to have a panic attack. Pt reports with the panic attack had left sided chest pain and SOB. Pt also complains of nasal congestion but denies fever or new cough. Denies V/D, abdominal or urinary sx. He denies any suicidal or homicidal thoughts. Denies prior known cardiac hx.  Pt uses ETOH, drugs and tobacco.     History provided by:  Patient, medical records and EMS personnel      Review of Systems   Constitutional:  Negative for chills and fever.   HENT:  Negative for congestion, sore throat and trouble swallowing.    Eyes:  Negative for visual disturbance.   Respiratory:  Positive for shortness of breath. Negative for cough.    Cardiovascular:  Positive for chest pain. Negative for leg swelling.   Gastrointestinal:  Negative for abdominal pain, diarrhea, nausea and vomiting.   Genitourinary:  Negative for difficulty urinating, dysuria and flank pain.   Musculoskeletal:  Negative for arthralgias and back pain.   Skin: Negative.  Negative for rash and wound.   Allergic/Immunologic: Negative.    Neurological:  Negative for dizziness, syncope, weakness, numbness and headaches.   Psychiatric/Behavioral:  Negative for confusion, self-injury, sleep disturbance and suicidal ideas. The patient is nervous/anxious.    All other systems reviewed and are negative.      Past Medical History:   Diagnosis Date    ADHD (attention deficit hyperactivity disorder) 2015    Allergic 2007    Busipron, lithium,zyprexa,invega,thorizine,cymbalta,zolft,celxa,vivotrol,lucemyra,abilify,vraylar,    Anxiety 2008    Arthritis 2009    Disc generative disease bulging disc    COPD (chronic obstructive pulmonary disease) 2013    Scared lungs from pneumonia house [facility]    Erectile  dysfunction     Hypertension 2010    Low back pain 2010    Pneumonia     Substance abuse 1999       Allergies   Allergen Reactions    Chlorpromazine Anaphylaxis    Lithium Swelling    Olanzapine Anaphylaxis    Paliperidone Anaphylaxis    Flunarizine Other (See Comments)    Celecoxib Other (See Comments) and Unknown (See Comments)     Other reaction(s): Other (See Comments), Unknown   Indigestion   Indigestion    Indigestion    Ibuprofen Other (See Comments) and Unknown (See Comments)     Other reaction(s): Other (See Comments), Unknown   Pt states it makes him sweat    Pt states it makes him sweat     Pt states it makes him sweat     Pt states it makes him sweat    Sulfamethoxazole-Trimethoprim Other (See Comments) and Unknown (See Comments)     Other reaction(s): Other (See Comments), Unknown   heartburn   heartburn    heartburn    Tramadol Other (See Comments) and Unknown (See Comments)     Other reaction(s): Other (See Comments), Unknown   Erectile dysfunction   Erectile dysfunction    Erectile dysfunction    Trazodone Other (See Comments) and Unknown (See Comments)     Other reaction(s): Other (See Comments), Unknown   Patient reports possible priapism   Patient reports possible priapism    Patient reports possible priapism       Past Surgical History:   Procedure Laterality Date    SPINE SURGERY  2010    Ex-ray injection       Family History   Problem Relation Age of Onset    Alcohol abuse Father     Anxiety disorder Father     Arthritis Father     Hyperlipidemia Father     Hypertension Father     Drug abuse Brother        Social History     Socioeconomic History    Marital status:    Tobacco Use    Smoking status: Former     Current packs/day: 0.00     Average packs/day: 3.0 packs/day for 26.1 years (78.2 ttl pk-yrs)     Types: Cigarettes     Start date: 1995     Quit date: 2021     Years since quittin.0     Passive exposure: Past    Smokeless tobacco: Never   Vaping Use    Vaping  status: Never Used   Substance and Sexual Activity    Alcohol use: Yes     Alcohol/week: 12.0 standard drinks of alcohol     Types: 12 Shots of liquor per week     Comment: Pint 100 proof boogie    Drug use: Defer    Sexual activity: Not Currently     Partners: Female     Birth control/protection: Abstinence     Comment: Need prior authorization for implant           Objective   Physical Exam  Vitals and nursing note reviewed.   Constitutional:       Appearance: He is well-developed.   HENT:      Head: Atraumatic.      Nose: Nose normal.   Eyes:      General: Lids are normal.      Conjunctiva/sclera: Conjunctivae normal.      Pupils: Pupils are equal, round, and reactive to light.   Cardiovascular:      Rate and Rhythm: Normal rate and regular rhythm.      Heart sounds: Normal heart sounds.   Pulmonary:      Effort: Pulmonary effort is normal.      Breath sounds: Normal breath sounds.   Abdominal:      General: There is no distension.      Palpations: Abdomen is soft.      Tenderness: There is no abdominal tenderness. There is no guarding or rebound.   Musculoskeletal:         General: No tenderness or deformity. Normal range of motion.      Cervical back: Normal range of motion and neck supple.   Skin:     General: Skin is warm and dry.   Neurological:      Mental Status: He is alert and oriented to person, place, and time.      Sensory: No sensory deficit.   Psychiatric:         Mood and Affect: Mood is anxious.         Speech: Speech is rapid and pressured.         Behavior: Behavior is hyperactive. Behavior is cooperative.         Thought Content: Thought content does not include homicidal or suicidal plan.         Procedures           ED Course  ED Course as of 03/03/25 1512   Mon Mar 03, 2025   1017 I personally reviewed and interpreted labs results and went over with patient.   I personally reviewed and interpreted vitals.   [RT]   1018 BP(!): 172/120  Noted HTN, will continue to monitor [RT]   1444 HS  Troponin T: 7 [RT]   1511 proBNP: 325.8 [RT]   1511 WBC: 6.23 [RT]   1511 Glucose(!): 109 [RT]   1511 Creatinine(!): 0.75 [RT]   1512 EKG personally reviewed and interpreted with findings of NSR with rates 86 and 97.  [RT]   1512 I personally and independently reviewed CXR and agree with the radiology interpretation specifically no acute findings.  [RT]   1512 Re-examined patient and updated on results and tx plan. Will dc home and he will f/u with PCP.  [RT]      ED Course User Index  [RT] Susie Nguyen PA      Recent Results (from the past 24 hours)   ECG 12 Lead ED Triage Standing Order; Chest Pain    Collection Time: 03/03/25 10:04 AM   Result Value Ref Range    QT Interval 348 ms    QTC Interval 441 ms   High Sensitivity Troponin T    Collection Time: 03/03/25 11:20 AM    Specimen: Blood   Result Value Ref Range    HS Troponin T 7 <22 ng/L   Comprehensive Metabolic Panel    Collection Time: 03/03/25 11:20 AM    Specimen: Blood   Result Value Ref Range    Glucose 109 (H) 65 - 99 mg/dL    BUN 16 6 - 20 mg/dL    Creatinine 0.75 (L) 0.76 - 1.27 mg/dL    Sodium 139 136 - 145 mmol/L    Potassium 4.0 3.5 - 5.2 mmol/L    Chloride 100 98 - 107 mmol/L    CO2 26.0 22.0 - 29.0 mmol/L    Calcium 10.1 8.6 - 10.5 mg/dL    Total Protein 7.6 6.0 - 8.5 g/dL    Albumin 4.8 3.5 - 5.2 g/dL    ALT (SGPT) 34 1 - 41 U/L    AST (SGOT) 38 1 - 40 U/L    Alkaline Phosphatase 78 39 - 117 U/L    Total Bilirubin 0.4 0.0 - 1.2 mg/dL    Globulin 2.8 gm/dL    A/G Ratio 1.7 g/dL    BUN/Creatinine Ratio 21.3 7.0 - 25.0    Anion Gap 13.0 5.0 - 15.0 mmol/L    eGFR 114.8 >60.0 mL/min/1.73   Lipase    Collection Time: 03/03/25 11:20 AM    Specimen: Blood   Result Value Ref Range    Lipase 18 13 - 60 U/L   BNP    Collection Time: 03/03/25 11:20 AM    Specimen: Blood   Result Value Ref Range    proBNP 325.8 0.0 - 450.0 pg/mL   Green Top (Gel)    Collection Time: 03/03/25 11:20 AM   Result Value Ref Range    Extra Tube Hold for add-ons.    Lavender  Top    Collection Time: 03/03/25 11:20 AM   Result Value Ref Range    Extra Tube hold for add-on    Gold Top - SST    Collection Time: 03/03/25 11:20 AM   Result Value Ref Range    Extra Tube Hold for add-ons.    Gray Top    Collection Time: 03/03/25 11:20 AM   Result Value Ref Range    Extra Tube Hold for add-ons.    Light Blue Top    Collection Time: 03/03/25 11:20 AM   Result Value Ref Range    Extra Tube Hold for add-ons.    CBC Auto Differential    Collection Time: 03/03/25 11:20 AM    Specimen: Blood   Result Value Ref Range    WBC 6.23 3.40 - 10.80 10*3/mm3    RBC 4.37 4.14 - 5.80 10*6/mm3    Hemoglobin 13.5 13.0 - 17.7 g/dL    Hematocrit 40.2 37.5 - 51.0 %    MCV 92.0 79.0 - 97.0 fL    MCH 30.9 26.6 - 33.0 pg    MCHC 33.6 31.5 - 35.7 g/dL    RDW 13.3 12.3 - 15.4 %    RDW-SD 45.6 37.0 - 54.0 fl    MPV 9.6 6.0 - 12.0 fL    Platelets 273 140 - 450 10*3/mm3    Neutrophil % 60.5 42.7 - 76.0 %    Lymphocyte % 26.8 19.6 - 45.3 %    Monocyte % 9.3 5.0 - 12.0 %    Eosinophil % 2.4 0.3 - 6.2 %    Basophil % 0.8 0.0 - 1.5 %    Immature Grans % 0.2 0.0 - 0.5 %    Neutrophils, Absolute 3.77 1.70 - 7.00 10*3/mm3    Lymphocytes, Absolute 1.67 0.70 - 3.10 10*3/mm3    Monocytes, Absolute 0.58 0.10 - 0.90 10*3/mm3    Eosinophils, Absolute 0.15 0.00 - 0.40 10*3/mm3    Basophils, Absolute 0.05 0.00 - 0.20 10*3/mm3    Immature Grans, Absolute 0.01 0.00 - 0.05 10*3/mm3    nRBC 0.0 0.0 - 0.2 /100 WBC   ECG 12 Lead ED Triage Standing Order; Chest Pain    Collection Time: 03/03/25 12:25 PM   Result Value Ref Range    QT Interval 380 ms    QTC Interval 454 ms   High Sensitivity Troponin T    Collection Time: 03/03/25  1:59 PM    Specimen: Blood   Result Value Ref Range    HS Troponin T 7 <22 ng/L     Note: In addition to lab results from this visit, the labs listed above may include labs taken at another facility or during a different encounter within the last 24 hours. Please correlate lab times with ED admission and discharge times  "for further clarification of the services performed during this visit.    XR Chest 1 View   Final Result   Impression:   No acute cardiopulmonary abnormality is identified.            Electronically Signed: Fernanda Shelton     3/3/2025 11:09 AM EST     Workstation ID: [id number]        Vitals:    03/03/25 0956 03/03/25 0957 03/03/25 1114 03/03/25 1220   BP:  (!) 172/120 (!) 165/106 144/84   BP Location:  Left arm     Patient Position:  Sitting     Pulse:  104 87 92   Resp:  18 16 14   Temp:  98.3 °F (36.8 °C)     TempSrc:  Oral     SpO2:  100% 99% 99%   Weight: 77.1 kg (170 lb)      Height: 180.3 cm (71\")        Medications   sodium chloride 0.9 % flush 10 mL (has no administration in time range)   hydrOXYzine (ATARAX) tablet 25 mg (25 mg Oral Given 3/3/25 1219)     ECG/EMG Results (last 24 hours)       Procedure Component Value Units Date/Time    ECG 12 Lead ED Triage Standing Order; Chest Pain [[id number]] Collected: 03/03/25 1004     Updated: 03/03/25 1029     QT Interval 348 ms      QTC Interval 441 ms     Narrative:      Test Reason : ED Triage Standing Order~  Blood Pressure :   */*   mmHG  Vent. Rate :  97 BPM     Atrial Rate :  97 BPM     P-R Int : 144 ms          QRS Dur : 108 ms      QT Int : 348 ms       P-R-T Axes :  60 -36  42 degrees    QTcB Int : 441 ms    Normal sinus rhythm with sinus arrhythmia  Left axis deviation  Abnormal ECG  No previous ECGs available  Confirmed by MD Lilia, Reynaldo ([id number]) on 3/3/2025 10:29:19 AM    Referred By: EDMD           Confirmed By: Reynaldo Rodrigues MD    ECG 12 Lead ED Triage Standing Order; Chest Pain [[id number]] Collected: 03/03/25 1225     Updated: 03/03/25 1256     QT Interval 380 ms      QTC Interval 454 ms     Narrative:      Test Reason : ED Triage Standing Order~  Blood Pressure :   */*   mmHG  Vent. Rate :  86 BPM     Atrial Rate :  86 BPM     P-R Int : 138 ms          QRS Dur : 112 ms      QT Int : 380 ms       P-R-T Axes :  56 -31  33 degrees    QTcB Int : 454 " ms    Normal sinus rhythm  Left axis deviation  Minimal voltage criteria for LVH, may be normal variant  Abnormal ECG  When compared with ECG of 03-Mar-2025 10:04,  No significant change was found  Confirmed by MD Rodrigues Michael ([id number]) on 3/3/2025 12:55:49 PM    Referred By: NANCI           Confirmed By: Reynaldo Rodrigues MD          ECG 12 Lead ED Triage Standing Order; Chest Pain   Final Result   Test Reason : ED Triage Standing Order~   Blood Pressure :   */*   mmHG   Vent. Rate :  86 BPM     Atrial Rate :  86 BPM      P-R Int : 138 ms          QRS Dur : 112 ms       QT Int : 380 ms       P-R-T Axes :  56 -31  33 degrees     QTcB Int : 454 ms      Normal sinus rhythm   Left axis deviation   Minimal voltage criteria for LVH, may be normal variant   Abnormal ECG   When compared with ECG of 03-Mar-2025 10:04,   No significant change was found   Confirmed by MD Rodrigues Michael ([id number]) on 3/3/2025 12:55:49 PM      Referred By: NANCI           Confirmed By: Reynaldo Rodrigues MD      ECG 12 Lead ED Triage Standing Order; Chest Pain   Final Result   Test Reason : ED Triage Standing Order~   Blood Pressure :   */*   mmHG   Vent. Rate :  97 BPM     Atrial Rate :  97 BPM      P-R Int : 144 ms          QRS Dur : 108 ms       QT Int : 348 ms       P-R-T Axes :  60 -36  42 degrees     QTcB Int : 441 ms      Normal sinus rhythm with sinus arrhythmia   Left axis deviation   Abnormal ECG   No previous ECGs available   Confirmed by MD Rodrigues Michael ([id number]) on 3/3/2025 10:29:19 AM      Referred By: CAITLIN           Confirmed By: Reynaldo Rodrigues MD                      HEART Score: 1   Shared Decision Making  I discussed the findings with the patient/patient representative who is in agreement with the treatment plan and the final disposition.  Risks and benefits of discharge and/or observation/admission were discussed: Yes                                      Medical Decision Making  Pt is a 43 yo male presenting to ED with complaints of CP, SOB and  panic attacks.  I had a discussion with the patient / family regarding ED course, diagnosis, diagnostic results and treatment plan including medications and admission / discharge. Discussed if new or worse symptoms / concerns to return to ED for further evaluation. Discussed need for close follow up with PCP / specialists.     DDx  ACS, NSTEMI, CHF, Pneumonia, Anxiety / panic attack, Drug use    Problems Addressed:  Chest pain, unspecified type: complicated acute illness or injury  Panic attack: complicated acute illness or injury  SOB (shortness of breath): complicated acute illness or injury    Amount and/or Complexity of Data Reviewed  External Data Reviewed: notes.     Details: Reviewed previous non ED visits including prior labs, imaging, available notes, medications, allergies and surgical hx.   [facility] ED 2-26-25 for substance abuse / withdrawal   Labs: ordered. Decision-making details documented in ED Course.  Radiology: ordered and independent interpretation performed. Decision-making details documented in ED Course.  ECG/medicine tests: ordered and independent interpretation performed. Decision-making details documented in ED Course.    Risk  Prescription drug management.        Final diagnoses:   Chest pain, unspecified type   SOB (shortness of breath)   Panic attack       ED Disposition  ED Disposition       ED Disposition   Discharge    Condition   Stable    Comment   --               PATIENT CONNECTION - [facility] [address]  [phone]  Schedule an appointment as soon as possible for a visit       [facility] EMERGENCY DEPARTMENT  [address] [id number] [phone]    If symptoms worsen         Medication List      No changes were made to your prescriptions during this visit.            Susie Nguyen PA  03/03/25 3682